# Patient Record
Sex: MALE | Race: WHITE | NOT HISPANIC OR LATINO | URBAN - METROPOLITAN AREA
[De-identification: names, ages, dates, MRNs, and addresses within clinical notes are randomized per-mention and may not be internally consistent; named-entity substitution may affect disease eponyms.]

---

## 2018-12-26 ENCOUNTER — OUTPATIENT (OUTPATIENT)
Dept: OUTPATIENT SERVICES | Facility: HOSPITAL | Age: 53
LOS: 1 days | Discharge: HOME | End: 2018-12-26

## 2018-12-26 VITALS
RESPIRATION RATE: 17 BRPM | OXYGEN SATURATION: 97 % | HEART RATE: 52 BPM | SYSTOLIC BLOOD PRESSURE: 136 MMHG | DIASTOLIC BLOOD PRESSURE: 73 MMHG

## 2018-12-26 VITALS
HEIGHT: 68 IN | SYSTOLIC BLOOD PRESSURE: 171 MMHG | DIASTOLIC BLOOD PRESSURE: 79 MMHG | OXYGEN SATURATION: 97 % | RESPIRATION RATE: 45 BRPM | WEIGHT: 190.04 LBS | TEMPERATURE: 98 F

## 2018-12-26 DIAGNOSIS — Z90.49 ACQUIRED ABSENCE OF OTHER SPECIFIED PARTS OF DIGESTIVE TRACT: Chronic | ICD-10-CM

## 2018-12-26 RX ORDER — SODIUM CHLORIDE 9 MG/ML
1000 INJECTION, SOLUTION INTRAVENOUS
Qty: 0 | Refills: 0 | Status: DISCONTINUED | OUTPATIENT
Start: 2018-12-26 | End: 2019-01-10

## 2018-12-26 RX ORDER — OXYCODONE AND ACETAMINOPHEN 5; 325 MG/1; MG/1
1 TABLET ORAL ONCE
Qty: 0 | Refills: 0 | Status: DISCONTINUED | OUTPATIENT
Start: 2018-12-26 | End: 2018-12-26

## 2018-12-26 RX ORDER — HYDROMORPHONE HYDROCHLORIDE 2 MG/ML
0.5 INJECTION INTRAMUSCULAR; INTRAVENOUS; SUBCUTANEOUS
Qty: 0 | Refills: 0 | Status: DISCONTINUED | OUTPATIENT
Start: 2018-12-26 | End: 2018-12-26

## 2018-12-26 RX ORDER — ONDANSETRON 8 MG/1
4 TABLET, FILM COATED ORAL ONCE
Qty: 0 | Refills: 0 | Status: DISCONTINUED | OUTPATIENT
Start: 2018-12-26 | End: 2019-01-10

## 2018-12-26 RX ADMIN — SODIUM CHLORIDE 100 MILLILITER(S): 9 INJECTION, SOLUTION INTRAVENOUS at 09:46

## 2018-12-26 NOTE — CHART NOTE - NSCHARTNOTEFT_GEN_A_CORE
Pt made comfortable on OR table, ASA monitors applied. Versed 2mg  and Fentanyl 50 mcgs given pt noted to have decrease in HR from 70's to high 30's. glyco 0.2 mg given without positive response. Discussed cardiac status with Dr. Islas decision made to cancel case and have pt reschedule after full cardiac workup. 12 Lead EKG to be obtained in PACU and pt to have cardiac evaluation

## 2018-12-26 NOTE — BRIEF OPERATIVE NOTE - PRE-OP DX
Compression of ulnar nerve at multiple levels, left  12/26/2018    Active  Alxe Islas  Joint synovitis  12/26/2018    Active  Alex Islas  Other primary arthrosis of first carpometacarpal joint  12/26/2018    Active  Alex Islas  Subluxation of thumb  12/26/2018    Active  Alex Islas  Synovitis and tenosynovitis of hand  12/26/2018    Active  Alex Islas  Tendon adhesions  12/26/2018    Active  Alex Islas

## 2018-12-26 NOTE — PRE-ANESTHESIA EVALUATION ADULT - NSPROPOSEDPROCEDFT_GEN_ALL_CORE
Left thumb metacarpal arthrodesis, neuroplasty ulnar nerve at left elbow, tenolysis left middle finger

## 2018-12-26 NOTE — BRIEF OPERATIVE NOTE - PROCEDURE
<<-----Click on this checkbox to enter Procedure Neurolysis of ulnar nerve  12/26/2018    Active  TWOLOSMUMTAZ  Neuroplasty of left ulnar nerve  12/26/2018    Active  JANNETTE  Synovectomy of finger  12/26/2018    Active  JANNETTE  Tenolysis of flexor tendon of finger  12/26/2018    Active  TWOLOSMUMTAZ  Extensor tendon repair of finger of left hand  12/26/2018    Active  JANNETTE  Synovectomy of metacarpophalangeal (MCP) joint  12/26/2018    Active  JANNETTE  Arthrodesis  12/26/2018    Active  JANNETTE

## 2018-12-26 NOTE — CONSULT NOTE ADULT - SUBJECTIVE AND OBJECTIVE BOX
Patient is a 77y old  Male who presents with a chief complaint of bradycardia    HPI:    77 year old man with PMHx DLD, HTN presenting to ambulatory surgery for nerve repair of trigger finger. During induction with 2mg versed and 50mcg fentanyl the patient's HR dropped from 70s to 30s and bp 146/84. The patient was given glycopyrrolate and the surgery was cancelled until further cardiac workup. 12 lead EKG done after the patient was brought to [facility] showed sinus arrhythmia with HR 66 bpm. As per the patient this happened about 5 years ago and he was told by his PMD that because he is very active(plays volleyball) that his HR will be low. Additionally, he says when he takes his blood pressure readings that his HR never goes above 50. He denied any SOB, dizziness, HA, cp, fatigue, LE swelling.      PAST MEDICAL & SURGICAL HISTORY:  High cholesterol  HTN (hypertension)  S/P cholecystectomy: spine surgery, left carpal tunnel       ECHO: N/A      MEDICATIONS  (STANDING):  lactated ringers. 1000 milliLiter(s) (100 mL/Hr) IV Continuous <Continuous>    MEDICATIONS  (PRN):  HYDROmorphone  Injectable 0.5 milliGRAM(s) IV Push every 10 minutes PRN Severe Pain (7 - 10)  ondansetron Injectable 4 milliGRAM(s) IV Push once PRN Nausea and/or Vomiting  oxyCODONE    5 mG/acetaminophen 325 mG 1 Tablet(s) Oral once PRN Moderate Pain (4 - 6)      FAMILY HISTORY:  Non-contributory    REVIEW OF SYSTEMS:  CONSTITUTIONAL: No fever, weight loss, or fatigue  EYES: No eye pain, visual disturbances, or discharge  ENMT:  No difficulty hearing, tinnitus, vertigo; No sinus or throat pain  NECK: No pain or stiffness  RESPIRATORY: No cough, wheezing, chills or hemoptysis; No shortness of breath  CARDIOVASCULAR: No chest pain, palpitations, dizziness, or leg swelling  GASTROINTESTINAL: No abdominal or epigastric pain. No nausea, vomiting, or hematemesis; No diarrhea or constipation. No melena or hematochezia.  GENITOURINARY: No dysuria, frequency, hematuria, or incontinence  NEUROLOGICAL: No headaches, memory loss, loss of strength, numbness, or tremors  SKIN: No itching, burning, rashes, or lesions   LYMPH NODES: No enlarged glands  ENDOCRINE: No heat or cold intolerance; No hair loss  MUSCULOSKELETAL: No joint pain or swelling; No muscle, back, or extremity pain  PSYCHIATRIC: No depression, anxiety, mood swings, or difficulty sleeping  HEME/LYMPH: No easy bruising, or bleeding gums  ALLERY AND IMMUNOLOGIC: No hives or eczema    	  SOCIAL HISTORY:    Denied smoking  Denied alcohol use  Denied illicit drug use    Vital Signs Last 24 Hrs  T(C): 36.4 (26 Dec 2018 09:30), Max: 36.6 (26 Dec 2018 07:58)  T(F): 97.5 (26 Dec 2018 09:30), Max: 97.8 (26 Dec 2018 07:58)  HR: 44 (26 Dec 2018 10:00) (44 - 65)  BP: 117/70 (26 Dec 2018 10:00) (109/72 - 171/79)  BP(mean): --  RR: 16 (26 Dec 2018 10:00) (16 - 45)  SpO2: 98% (26 Dec 2018 10:00) (95% - 98%)    PHYSICAL EXAM:  GENERAL: NAD, well-groomed, well-developed  HEAD:  NCAT  EYES: EOMI, PERRLA, conjunctiva clear  ENMT: No tonsillar erythema, exudates, or enlargement; Moist mucous membranes, Good dentition, No lesions  NECK: Supple, No JVD, Normal thyroid  NERVOUS SYSTEM: AAOX4, Good concentration; Motor Strength 5/5 B/L upper and lower extremities  CHEST/LUNG: CTA b/l no w/r/r  HEART: +s1s2 bradycardic no m/g/r  ABDOMEN: soft, NT/ND (+) bs, no HSM  EXTREMITIES:  2+ Peripheral Pulses, No c/c/e  LYMPH: No lymphadenopathy noted  SKIN: No rashes or lesions      ECG:   NSR with sinus arrhythmia VR 66 bpm  QTc 454 ms CA 0.16ms QRS 90ms    rhythm strip from case: HR 38bpm    Preop EKG 12/17/2018: NSR HR 75 bpm    LABS:   Na 140   K 4.3  Cl: 97  CO2 24  Ca 9.7  Cr 1.05  GFR 69     TSH 4.15  T3(total) 108  T4 7.3  Free T4 1.28    Hb 15.5  Hct 46.8  plt 215  WBC 9.1    RADIOLOGY & ADDITIONAL STUDIES:    CXR PA and lat(12/17/2018): No acute cardiopulmonary disease Patient is a 77y old  Male who presents with a chief complaint of bradycardia    HPI:    77 year old man with PMHx DLD, HTN presenting to ambulatory surgery for nerve repair of trigger finger. During induction with 2mg versed and 50mcg fentanyl the patient's HR dropped from 70s to 30s and bp 146/84. The patient was given glycopyrrolate and the surgery was cancelled until further cardiac workup. 12 lead EKG done after the patient was brought to [facility] showed sinus arrhythmia with HR 66 bpm. As per the patient this happened about 5 years ago and he was told by his PMD that because he is very active(plays volleyball, raquetball) that his HR will be low. Additionally, he says when he takes his blood pressure readings that his HR never goes above 50. He denied any SOB, dizziness, HA, cp, fatigue, LE swelling. Of note, when asked, he admits to non-restorative sleep, wife corroborates that he does indeed snore and at times gasps for air in the middle of the night.      PAST MEDICAL & SURGICAL HISTORY:  High cholesterol  HTN (hypertension)  S/P cholecystectomy: spine surgery, left carpal tunnel       ECHO: N/A      MEDICATIONS  (STANDING):  lactated ringers. 1000 milliLiter(s) (100 mL/Hr) IV Continuous <Continuous>    MEDICATIONS  (PRN):  HYDROmorphone  Injectable 0.5 milliGRAM(s) IV Push every 10 minutes PRN Severe Pain (7 - 10)  ondansetron Injectable 4 milliGRAM(s) IV Push once PRN Nausea and/or Vomiting  oxyCODONE    5 mG/acetaminophen 325 mG 1 Tablet(s) Oral once PRN Moderate Pain (4 - 6)      FAMILY HISTORY:  Non-contributory    REVIEW OF SYSTEMS:  CONSTITUTIONAL: No fever, weight loss, or fatigue  EYES: No eye pain, visual disturbances, or discharge  ENMT:  No difficulty hearing, tinnitus, vertigo; No sinus or throat pain  NECK: No pain or stiffness  RESPIRATORY: No cough, wheezing, chills or hemoptysis; No shortness of breath  CARDIOVASCULAR: No chest pain, palpitations, dizziness, or leg swelling  GASTROINTESTINAL: No abdominal or epigastric pain. No nausea, vomiting, or hematemesis; No diarrhea or constipation. No melena or hematochezia.  GENITOURINARY: No dysuria, frequency, hematuria, or incontinence  NEUROLOGICAL: No headaches, memory loss, loss of strength, numbness, or tremors  SKIN: No itching, burning, rashes, or lesions   LYMPH NODES: No enlarged glands  ENDOCRINE: No heat or cold intolerance; No hair loss  MUSCULOSKELETAL: No joint pain or swelling; No muscle, back, or extremity pain  PSYCHIATRIC: No depression, anxiety, mood swings, or difficulty sleeping  HEME/LYMPH: No easy bruising, or bleeding gums  ALLERY AND IMMUNOLOGIC: No hives or eczema    	  SOCIAL HISTORY:    Denied smoking  Denied alcohol use  Denied illicit drug use    Vital Signs Last 24 Hrs  T(C): 36.4 (26 Dec 2018 09:30), Max: 36.6 (26 Dec 2018 07:58)  T(F): 97.5 (26 Dec 2018 09:30), Max: 97.8 (26 Dec 2018 07:58)  HR: 44 (26 Dec 2018 10:00) (44 - 65)  BP: 117/70 (26 Dec 2018 10:00) (109/72 - 171/79)  BP(mean): --  RR: 16 (26 Dec 2018 10:00) (16 - 45)  SpO2: 98% (26 Dec 2018 10:00) (95% - 98%)    PHYSICAL EXAM:  GENERAL: NAD, well-groomed, well-developed  HEAD:  NCAT  EYES: EOMI, PERRLA, conjunctiva clear  ENMT: No tonsillar erythema, exudates, or enlargement; Moist mucous membranes, Good dentition, No lesions  NECK: Supple, No JVD, Normal thyroid  NERVOUS SYSTEM: AAOX4, Good concentration; Motor Strength 5/5 B/L upper and lower extremities  CHEST/LUNG: CTA b/l no w/r/r  HEART: +s1s2 bradycardic no m/g/r  ABDOMEN: soft, NT/ND (+) bs, no HSM  EXTREMITIES:  2+ Peripheral Pulses, No c/c/e  LYMPH: No lymphadenopathy noted  SKIN: No rashes or lesions      ECG:   NSR with sinus arrhythmia VR 66 bpm  QTc 454 ms MI 0.16ms QRS 90ms    rhythm strip from case: HR 38bpm    Preop EKG 12/17/2018: NSR HR 75 bpm    LABS:   Na 140   K 4.3  Cl: 97  CO2 24  Ca 9.7  Cr 1.05  GFR 69     TSH 4.15  T3(total) 108  T4 7.3  Free T4 1.28    Hb 15.5  Hct 46.8  plt 215  WBC 9.1    RADIOLOGY & ADDITIONAL STUDIES:    CXR PA and lat(12/17/2018): No acute cardiopulmonary disease Patient is a 77y old  Male who presents with a chief complaint of bradycardia    HPI:    77 year old man with PMHx DLD, HTN presenting to ambulatory surgery for nerve repair of trigger finger. During induction with 2mg versed and 50mcg fentanyl the patient's HR dropped from 70s to 30s and bp 146/84. The patient was given glycopyrrolate and the surgery was cancelled until further cardiac workup. 12 lead EKG done after the patient was brought to [facility] showed sinus arrhythmia with HR 66 bpm. As per the patient this happened about 5 years ago and he was told by his PMD that because he is very active(plays volleyball, raquetball) that his HR will be low. Additionally, he says when he takes his blood pressure readings that his HR never goes above 50. He denied any SOB, dizziness, HA, cp, fatigue, LE swelling. Of note, when asked, he admits to non-restorative sleep, wife corroborates that he does indeed snore and at times gasps for air in the middle of the night.      PAST MEDICAL & SURGICAL HISTORY:  High cholesterol  HTN (hypertension)  S/P cholecystectomy: spine surgery, left carpal tunnel       ECHO: N/A      MEDICATIONS  (STANDING):  lactated ringers. 1000 milliLiter(s) (100 mL/Hr) IV Continuous <Continuous>    MEDICATIONS  (PRN):  HYDROmorphone  Injectable 0.5 milliGRAM(s) IV Push every 10 minutes PRN Severe Pain (7 - 10)  ondansetron Injectable 4 milliGRAM(s) IV Push once PRN Nausea and/or Vomiting  oxyCODONE    5 mG/acetaminophen 325 mG 1 Tablet(s) Oral once PRN Moderate Pain (4 - 6)      FAMILY HISTORY:  Non-contributory    REVIEW OF SYSTEMS:  CONSTITUTIONAL: No fever, weight loss, or fatigue  EYES: No eye pain, visual disturbances, or discharge  ENMT:  No difficulty hearing, tinnitus, vertigo; No sinus or throat pain  NECK: No pain or stiffness  RESPIRATORY: No cough, wheezing, chills or hemoptysis; No shortness of breath  CARDIOVASCULAR: No chest pain, palpitations, dizziness, or leg swelling  GASTROINTESTINAL: No abdominal or epigastric pain. No nausea, vomiting, or hematemesis; No diarrhea or constipation. No melena or hematochezia.  GENITOURINARY: No dysuria, frequency, hematuria, or incontinence  NEUROLOGICAL: No headaches, memory loss, loss of strength, numbness, or tremors  SKIN: No itching, burning, rashes, or lesions   LYMPH NODES: No enlarged glands  ENDOCRINE: No heat or cold intolerance; No hair loss  MUSCULOSKELETAL: No joint pain or swelling; No muscle, back, or extremity pain  PSYCHIATRIC: No depression, anxiety, mood swings, or difficulty sleeping  HEME/LYMPH: No easy bruising, or bleeding gums  ALLERY AND IMMUNOLOGIC: No hives or eczema    	  SOCIAL HISTORY:    Denied smoking  Denied alcohol use  Denied illicit drug use    Vital Signs Last 24 Hrs  T(C): 36.4 (26 Dec 2018 09:30), Max: 36.6 (26 Dec 2018 07:58)  T(F): 97.5 (26 Dec 2018 09:30), Max: 97.8 (26 Dec 2018 07:58)  HR: 44 (26 Dec 2018 10:00) (44 - 65)  BP: 117/70 (26 Dec 2018 10:00) (109/72 - 171/79)  BP(mean): --  RR: 16 (26 Dec 2018 10:00) (16 - 45)  SpO2: 98% (26 Dec 2018 10:00) (95% - 98%)    PHYSICAL EXAM:  GENERAL: NAD, well-groomed, well-developed  HEAD:  NCAT  EYES: EOMI, PERRLA, conjunctiva clear  ENMT: No tonsillar erythema, exudates, or enlargement; Moist mucous membranes, Good dentition, No lesions  NECK: Supple, No JVD, Normal thyroid  NERVOUS SYSTEM: AAOX4, Good concentration; Motor Strength 5/5 B/L upper and lower extremities  CHEST/LUNG: CTA b/l no w/r/r  HEART: +s1s2 bradycardic no m/g/r  ABDOMEN: soft, NT/ND (+) bs, no HSM  EXTREMITIES/MS:  2+ Peripheral Pulses, No c/c/e  LYMPH: No lymphadenopathy noted  SKIN: No rashes or lesions      ECG:   NSR with sinus arrhythmia VR 66 bpm  QTc 454 ms FL 0.16ms QRS 90ms    rhythm strip from case: HR 38bpm    Preop EKG 12/17/2018: NSR HR 75 bpm    LABS:   Na 140   K 4.3  Cl: 97  CO2 24  Ca 9.7  Cr 1.05  GFR 69     TSH 4.15  T3(total) 108  T4 7.3  Free T4 1.28    Hb 15.5  Hct 46.8  plt 215  WBC 9.1    RADIOLOGY & ADDITIONAL STUDIES:    CXR PA and lat(12/17/2018): No acute cardiopulmonary disease

## 2018-12-26 NOTE — CONSULT NOTE ADULT - ASSESSMENT
77 year old man with PMHx DLD, HTN presenting to ambulatory surgery for nerve repair of trigger finger and became bradycardic to 30s during induction with 2mg versed and 50mcg fentanyl. Bp 146/84    Impression  Bradycardia 2/2 anesthesia vs athlete's heart vs SSS. r/o chronotropic incompetence  DLD  HTN    Plan  -pt HR increased from 40s to mid 70s upon moving around.  -will need exercise stress test to assess/prove chronotropic comepetence(or incompetence)  -patient currently asymptomatic    ** WILL DISCUSS WITH ATTENDING** 77 year old man with PMHx DLD, HTN presenting to ambulatory surgery for nerve repair of trigger finger and became bradycardic to 30s during induction with 2mg versed and 50mcg fentanyl. Bp 146/84    Impression  Bradycardia 2/2 anesthesia vs athlete's heart vs SSS. r/o chronotropic incompetence  DLD  HTN    Plan  -pt HR increased from 40s to mid 70s upon moving around.  -will need exercise stress test to assess/prove chronotropic competence(or incompetence)  -patient evidently had what sounds to be a nuclear stress test before a back surgery which was normal. Attempt to get reports.  -patient currently asymptomatic  -will likely need sleep study to assess for RICARDO    ** WILL DISCUSS WITH ATTENDING** 77 year old man with PMHx DLD, HTN presenting to ambulatory surgery for nerve repair of trigger finger and became bradycardic to 30s during induction with 2mg versed and 50mcg fentanyl. Bp 146/84    Impression  Bradycardia 2/2 anesthesia vs athlete's heart vs SSS. r/o chronotropic incompetence  DLD  HTN    Plan  -pt HR increased from 40s to mid 70s upon moving around.  -may need exercise stress test to officially assess/prove chronotropic competence(or incompetence), Can be done as outpatient  -patient evidently had what sounds to be a nuclear stress test before a back surgery which was normal. Attempt to get reports.  -patient currently asymptomatic  -will likely need sleep study to assess for RICARDO    ** WILL DISCUSS WITH ATTENDING** 77 year old man with PMHx DLD, HTN presenting to ambulatory surgery for nerve repair of trigger finger and became bradycardic to 30s during induction with 2mg versed and 50mcg fentanyl. Bp 146/84    Impression  Bradycardia 2/2 anesthesia vs athlete's heart vs SSS. r/o chronotropic incompetence  DLD  HTN    Plan  -pt HR increased from 40s to mid 70s upon moving around.  -may need exercise stress test to officially assess/prove chronotropic competence(or incompetence), Can be done as outpatient  -patient evidently had what sounds to be a nuclear stress test before a back surgery which was normal. Attempt to get reports.  -patient currently asymptomatic  -will likely need sleep study to assess for RICARDO  -can discharge from cardiac standpoint  -will need PMD follow up but not necessarily cardiology for workup. 77 year old man with PMHx DLD, HTN presenting to ambulatory surgery for nerve repair of trigger finger and became bradycardic to 30s during induction with 2mg versed and 50mcg fentanyl. Bp 146/84    Impression  Bradycardia 2/2 anesthesia vs athlete's heart vs SSS. r/o chronotropic incompetence  DLD  HTN    Plan  -pt HR increased from 40s to mid 70s upon moving around.  -exercise stress test to officially assess/prove chronotropic competence(or incompetence), as outpatient  -patient currently asymptomatic  -will likely need sleep study to assess for RICARDO  -can discharge home from cardiac standpoint

## 2018-12-26 NOTE — BRIEF OPERATIVE NOTE - POST-OP DX
Arthrosis of hand  12/26/2018    Active  Alex Islas  Compression of ulnar nerve at multiple levels, left  12/26/2018    Active  Alex Islas  Joint synovitis  12/26/2018    Alex Tabares  Subluxation of thumb  12/26/2018    Alex Tabares  Synovitis of hand  12/26/2018    Active  Alex Islas  Tendon adhesions  12/26/2018    Active  Alex Islas

## 2018-12-26 NOTE — CHART NOTE - NSCHARTNOTEFT_GEN_A_CORE
PACU ANESTHESIA ADMISSION NOTE      Procedure: case cancelled   Post op diagnosis:  left arm pain    ____  Intubated  TV:______       Rate: ______      FiO2: ______    _x___  Patent Airway    _x___  Full return of protective reflexes    __  Full recovery from anesthesia / back to baseline status    Vitals:  T97.5  HR: 62  BP:135/72  RR:16  SpO2:97     Mental Status:  _x___ Awake   _____ Alert   _____ Drowsy   _____ Sedated    Nausea/Vomiting:  _x___  NO       ______Yes,   See Post - Op Orders         Pain Scale (0-10):  __0___    Treatment: _x___ None    ____ See Post - Op/PCA Orders    Post - Operative Fluids:   __x__ Oral   ____ See Post - Op Orders    Plan: Discharge:   _x___Home       _____Floor     _____Critical Care    _____  Other:_________________    Comments: Spontaneously breathing VS stable   No anesthesia issues or complications noted.  Discharge when criteria met.

## 2019-01-03 DIAGNOSIS — Z02.9 ENCOUNTER FOR ADMINISTRATIVE EXAMINATIONS, UNSPECIFIED: ICD-10-CM

## 2019-01-23 PROBLEM — I10 ESSENTIAL (PRIMARY) HYPERTENSION: Chronic | Status: ACTIVE | Noted: 2018-12-26

## 2019-01-23 PROBLEM — E78.00 PURE HYPERCHOLESTEROLEMIA, UNSPECIFIED: Chronic | Status: ACTIVE | Noted: 2018-12-26

## 2019-02-13 ENCOUNTER — OUTPATIENT (OUTPATIENT)
Dept: OUTPATIENT SERVICES | Facility: HOSPITAL | Age: 54
LOS: 1 days | Discharge: HOME | End: 2019-02-13

## 2019-02-13 VITALS
HEART RATE: 58 BPM | RESPIRATION RATE: 18 BRPM | OXYGEN SATURATION: 97 % | TEMPERATURE: 98 F | SYSTOLIC BLOOD PRESSURE: 158 MMHG | DIASTOLIC BLOOD PRESSURE: 75 MMHG | HEIGHT: 68 IN | WEIGHT: 190.04 LBS

## 2019-02-13 VITALS
DIASTOLIC BLOOD PRESSURE: 60 MMHG | RESPIRATION RATE: 19 BRPM | OXYGEN SATURATION: 97 % | HEART RATE: 90 BPM | SYSTOLIC BLOOD PRESSURE: 124 MMHG

## 2019-02-13 DIAGNOSIS — Z90.49 ACQUIRED ABSENCE OF OTHER SPECIFIED PARTS OF DIGESTIVE TRACT: Chronic | ICD-10-CM

## 2019-02-13 RX ORDER — HYDROCHLOROTHIAZIDE 25 MG
0 TABLET ORAL
Qty: 0 | Refills: 0 | COMMUNITY

## 2019-02-13 RX ORDER — OXYCODONE AND ACETAMINOPHEN 5; 325 MG/1; MG/1
1 TABLET ORAL ONCE
Qty: 0 | Refills: 0 | Status: DISCONTINUED | OUTPATIENT
Start: 2019-02-13 | End: 2019-02-13

## 2019-02-13 RX ORDER — OMEGA-3 ACID ETHYL ESTERS 1 G
0 CAPSULE ORAL
Qty: 0 | Refills: 0 | COMMUNITY

## 2019-02-13 RX ORDER — SODIUM CHLORIDE 9 MG/ML
1000 INJECTION, SOLUTION INTRAVENOUS
Qty: 0 | Refills: 0 | Status: DISCONTINUED | OUTPATIENT
Start: 2019-02-13 | End: 2019-02-28

## 2019-02-13 RX ORDER — MORPHINE SULFATE 50 MG/1
2 CAPSULE, EXTENDED RELEASE ORAL
Qty: 0 | Refills: 0 | Status: DISCONTINUED | OUTPATIENT
Start: 2019-02-13 | End: 2019-02-13

## 2019-02-13 RX ORDER — ASPIRIN/CALCIUM CARB/MAGNESIUM 324 MG
1 TABLET ORAL
Qty: 0 | Refills: 0 | COMMUNITY

## 2019-02-13 RX ORDER — ONDANSETRON 8 MG/1
4 TABLET, FILM COATED ORAL ONCE
Qty: 0 | Refills: 0 | Status: DISCONTINUED | OUTPATIENT
Start: 2019-02-13 | End: 2019-02-28

## 2019-02-13 RX ORDER — HYDROMORPHONE HYDROCHLORIDE 2 MG/ML
0.5 INJECTION INTRAMUSCULAR; INTRAVENOUS; SUBCUTANEOUS
Qty: 0 | Refills: 0 | Status: DISCONTINUED | OUTPATIENT
Start: 2019-02-13 | End: 2019-02-13

## 2019-02-13 RX ADMIN — SODIUM CHLORIDE 100 MILLILITER(S): 9 INJECTION, SOLUTION INTRAVENOUS at 11:13

## 2019-02-13 NOTE — BRIEF OPERATIVE NOTE - OPERATION/FINDINGS
tendon adhesions, synovitis finger, compression apment of ulnar nerve at elbow, arthrosis synovitis MPJ thumb

## 2019-02-13 NOTE — BRIEF OPERATIVE NOTE - PRE-OP DX
Arthrosis of hand  02/13/2019    Active  Alex Islas  Compression of ulnar nerve at multiple levels, left  02/13/2019    Active  Alex Islas  Synovitis of finger  02/13/2019    Alex Tabares  Synovitis of hand  02/13/2019    Active  Alex Islas  Tendon adhesions  02/13/2019    Active  Alex Islas  Ulnar neuropathy at elbow  02/13/2019    Active  Alex Islas

## 2019-02-13 NOTE — BRIEF OPERATIVE NOTE - POST-OP DX
Arthrosis of hand  02/13/2019    Active  Alex Islas  Compression of ulnar nerve at multiple levels  02/13/2019    Active  Alex Islas  Joint synovitis  02/13/2019    Alex Tabares  Synovitis of finger  02/13/2019    Alex Tabares  Tendon adhesions  02/13/2019    Active  Alex Islas  Ulnar neuropathy at elbow  02/13/2019    Active  Alex Islas

## 2019-02-13 NOTE — CHART NOTE - NSCHARTNOTEFT_GEN_A_CORE
PACU ANESTHESIA ADMISSION NOTE      Procedure: Flexor tendon tenolysis of palm and finger  Synovectomy of finger  Neuroplasty and/or transposition; ulnar nerve at elbow  Decompression or neurolysis of ulnar or median nerve  Synovectomy of hand  Arthrodesis, MCP joint, thumb    Post op diagnosis:  Joint synovitis  Arthrosis of hand  Ulnar neuropathy at elbow  Compression of ulnar nerve at multiple levels  Synovitis of finger  Tendon adhesions      ____  Intubated  TV:______       Rate: ______      FiO2: ______    _x___  Patent Airway    __x__  Full return of protective reflexes    _x___  Full recovery from anesthesia / back to baseline     Vitals:   T:   97.5        R:  21                BP:    110/56              Sat:  96                 P: 100      Mental Status:  ____ Awake   _____ Alert   ___x__ Drowsy   _____ Sedated    Nausea/Vomiting:  ___x_ NO  ______Yes,   See Post - Op Orders          Pain Scale (0-10):  __0___    Treatment: ____ None    ____ See Post - Op/PCA Orders    Post - Operative Fluids:   ____ Oral   __x__ See Post - Op Orders    Plan: Discharge:   _x___Home       _____Floor     _____Critical Care    _____  Other:_________________    Comments: No anesthesia complications noted.

## 2019-02-13 NOTE — ASU DISCHARGE PLAN (ADULT/PEDIATRIC). - SPECIAL INSTRUCTIONS
DIET:    Resume normal diet  No alcoholic  beverages for 24 hours or if on prescribed narcotic pain medications.    MEDICATION:    Resume your preoperative oral medications.  Check with your physician before starting aspirin, Coumadin, or other blood thinners.  Prescriptions will be electronically prescribed from my office -  take as directed.      ACTIVITY:    Rest today and limit your activities for 24 hours.  Do not drive or operate machinery for 24 hours - if you received anesthesia.  When taking pain medication, be careful as you walk or climb stairs.  It is not advisable to drive while taking prescribed pain medication.    SPECIAL INSTRUCTIONS:    __x___ Elevate operative site above heart level or as directed.  _____ Apply ice to operative site as directed.  _____ Use  sling as directed.  __x___ Exercise fingers.    DRESSING CARE:    _____ You may change the dressing 4 days. Keep wound covered with band-aids.  __x___ Do not change the dressing until your doctor see you.  _____ You can loosen or rewrap the dressing.  _____  Keep dressing clean and dry.  _____ You may shower in _____ day(s) with the extremity covered by a plastic bag.  _____ OK to wash hand , including showers, in _____ day(s).    ADDITIONAL  INFORMATION:    Post operative visit should be scheduled for next week.  If you are not aware of visit please contact office.  If you have any questions or concerns call office at  [phone]    Notify your doctor if you develop   Fever  Excessive Swelling  Chills   Drainage   Pain not controlled by medication  Persistent numbness in hand or fingers    If an Emergency arises call 911 and/or go to the Emergency Room

## 2019-02-13 NOTE — BRIEF OPERATIVE NOTE - PROCEDURE
<<-----Click on this checkbox to enter Procedure Arthrodesis, MCP joint, thumb  02/13/2019    Active  TWOLOSZYN  Synovectomy of hand  02/13/2019    Active  TWOLOSZYN  Decompression or neurolysis of ulnar or median nerve  02/13/2019    Active  TWOLOSZYN  Neuroplasty and/or transposition; ulnar nerve at elbow  02/13/2019    Active  TWOLOSZYN  Synovectomy of finger  02/13/2019    Active  TWOLOSZYN  Flexor tendon tenolysis of palm and finger  02/13/2019    Active  TWOLOSZYN

## 2019-02-15 LAB — SURGICAL PATHOLOGY STUDY: SIGNIFICANT CHANGE UP

## 2019-02-17 DIAGNOSIS — M48.00 SPINAL STENOSIS, SITE UNSPECIFIED: ICD-10-CM

## 2019-02-17 DIAGNOSIS — Z87.442 PERSONAL HISTORY OF URINARY CALCULI: ICD-10-CM

## 2019-02-17 DIAGNOSIS — Z79.82 LONG TERM (CURRENT) USE OF ASPIRIN: ICD-10-CM

## 2019-02-17 DIAGNOSIS — M24.842: ICD-10-CM

## 2019-02-17 DIAGNOSIS — K57.30 DIVERTICULOSIS OF LARGE INTESTINE WITHOUT PERFORATION OR ABSCESS WITHOUT BLEEDING: ICD-10-CM

## 2019-02-17 DIAGNOSIS — M19.042 PRIMARY OSTEOARTHRITIS, LEFT HAND: ICD-10-CM

## 2019-02-17 DIAGNOSIS — I10 ESSENTIAL (PRIMARY) HYPERTENSION: ICD-10-CM

## 2019-02-17 DIAGNOSIS — M72.0 PALMAR FASCIAL FIBROMATOSIS [DUPUYTREN]: ICD-10-CM

## 2019-02-17 DIAGNOSIS — I34.0 NONRHEUMATIC MITRAL (VALVE) INSUFFICIENCY: ICD-10-CM

## 2019-02-17 DIAGNOSIS — G56.22 LESION OF ULNAR NERVE, LEFT UPPER LIMB: ICD-10-CM

## 2019-02-17 DIAGNOSIS — M65.842 OTHER SYNOVITIS AND TENOSYNOVITIS, LEFT HAND: ICD-10-CM

## 2019-02-17 DIAGNOSIS — E78.5 HYPERLIPIDEMIA, UNSPECIFIED: ICD-10-CM

## 2020-01-09 NOTE — BRIEF OPERATIVE NOTE - COMMENTS
----- Message from Luis Fernando Frank MD sent at 1/8/2020 10:08 PM CST -----  Please let patient know that the MRI confirms a subcutaneous lipoma. I recommend that we take it out as a day surgery procedure as it is still rather deep even though it is not within the muscle. If he agrees please place orders found in telephone encounter dated 8 December 2020.   Thanks,  ES Case was cancelled secondary to bradycadric episodes which may represent sick sinus syndrome, has had a history of this in the past but no treatment rendered will refer back to PMD for cariologic evaluation/

## 2023-01-12 PROBLEM — Z00.00 ENCOUNTER FOR PREVENTIVE HEALTH EXAMINATION: Status: ACTIVE | Noted: 2023-01-12

## 2023-01-13 ENCOUNTER — APPOINTMENT (OUTPATIENT)
Dept: ORTHOPEDIC SURGERY | Facility: CLINIC | Age: 58
End: 2023-01-13
Payer: COMMERCIAL

## 2023-01-13 DIAGNOSIS — Z78.9 OTHER SPECIFIED HEALTH STATUS: ICD-10-CM

## 2023-01-13 DIAGNOSIS — M75.82 OTHER SHOULDER LESIONS, LEFT SHOULDER: ICD-10-CM

## 2023-01-13 DIAGNOSIS — S46.212A STRAIN OF MUSCLE, FASCIA AND TENDON OF OTHER PARTS OF BICEPS, LEFT ARM, INITIAL ENCOUNTER: ICD-10-CM

## 2023-01-13 DIAGNOSIS — M77.12 LATERAL EPICONDYLITIS, LEFT ELBOW: ICD-10-CM

## 2023-01-13 PROCEDURE — 73030 X-RAY EXAM OF SHOULDER: CPT | Mod: LT

## 2023-01-13 PROCEDURE — 99214 OFFICE O/P EST MOD 30 MIN: CPT | Mod: 25

## 2023-01-13 PROCEDURE — 73070 X-RAY EXAM OF ELBOW: CPT | Mod: LT

## 2023-01-13 PROCEDURE — 99204 OFFICE O/P NEW MOD 45 MIN: CPT | Mod: 25

## 2023-01-13 PROCEDURE — 20610 DRAIN/INJ JOINT/BURSA W/O US: CPT | Mod: LT

## 2023-01-13 NOTE — IMAGING
[de-identified] : (Shoulder Examination)\par \par Laterality\par -Left\par \par General\par -In no acute distress: yes\par -Alert and oriented to person, place and time: yes\par -Lymphadenopathy: no\par -Peripheral edema: no\par -Sensation grossly intact to light touch: yes\par -Capillary refill less than 2 seconds: yes \par \par Inspection\par -Skin intact: yes\par -Swelling: no\par -Atrophy: no\par -Scapular winging: no\par -AC deformity: no\par -Biceps deformity: yes\par \par Tenderness to Palpation\par -Bicipital groove: yes\par -AC joint: no\par -Scapulothoracic: no\par -Cervical paraspinal: no\par \par Range of Motion\par -Active forward elevation: 165\par -Passive forward elevation: 175\par -External rotation at the side: 60\par -Internal rotation behind the back: T7 \par -Cervical spine: normal\par \par Strength \par -Forward elevation: 5-\par -External rotation at the side: 5\par -Internal rotation at the side: 5\par -Deltoid: 5\par \par Special Tests\par -Cortez: positive \par -O’Lobo’s: positive \par -Speed’s: positive \par -Cross body adduction: negative\par -Apprehension and relocation: negative\par -Sulcus sign: negative\par -Belly press: negative\par -Spurling’s: negative\par \par \par (Elbow Examination)\par \par Laterality\par -Left\par \par General\par -In no acute distress: yes\par -Alert and oriented to person, place and time: yes\par -Lymphadenopathy: no\par -Peripheral edema: no\par -Sensation grossly intact to light touch: yes\par -Capillary refill less than 2 seconds: yes \par \par Inspection\par -Skin intact: yes\par -Olecranon bursa swelling: no\par -Biceps deformity: no\par -Intrinsic atrophy: no\par \par Tenderness\par -Lateral epicondyle: yes\par -Medial epicondyle: no\par -Radial head: no\par -Olecranon tip: no\par -Antecubital fossa: no\par \par Range of Motion\par -Extension: 0\par -Flexion: 140\par -Pronation: 80\par -Supination: 80\par \par Strength \par -Flexion: 5\par -Extension: 5\par -Pronation: 5\par -Supination: 5\par -Index finger abduction: 5\par 	\par Special Tests\par -Cozen’s: positive\par -[place] biceps hook: negative \par -Tinel’s at elbow: negative \par -Moving valgus stress: negative\par \par  [Type 3 acromion] : Type 3 acromion [Left] : left elbow [There are no fractures, subluxations or dislocations. No significant abnormalities are seen] : There are no fractures, subluxations or dislocations. No significant abnormalities are seen [Degenerative change] : Degenerative change

## 2023-01-13 NOTE — HISTORY OF PRESENT ILLNESS
[de-identified] : (New Visit)\par \par Patient Details\par -Age: 57\par -Occupation: retired\par -Worker’s compensation claim: no\par -No-fault claim: no\par -School injury claim:no \par \par \par Symptom Details \par -Body part: LT shoulder\par -Duration of symptoms: 1 month\par -How symptoms began: reports onset 5 year ago, when he felt a pop in his anterior shoulder while lifting a TV. he reports that symptoms improved before recurring recently without trauma. he notes chronic deformity of his biceps\par -Location of pain: anterolateral\par -Quality of pain: dull\par -Pain score at rest: 2\par -Pain score with activity: 6\par -Swelling: no\par -Stiffness: yes\par -Radicular symptoms (numbness or radiating pain down extremity): no\par \par Symptom Details \par -Body part: LT elbow\par -Duration of symptoms: 3 months\par -How symptoms began: reports old UCL injury, which resolved, but recently had recurrence of lateral elbow pain with activity and sleeping\par -Location of pain: lateral\par -Quality of pain: dull\par -Pain score at rest: 1\par -Pain score with activity: 4\par -Swelling: no\par -Stiffness: yes\par -Radicular symptoms (numbness or radiating pain down extremity): to the wrist but not the fingers\par \par Treatment Details\par -Activity modification: yes has not been throwing\par -Medication: ibuprofen PRN\par -Physical therapy: no\par -Home exercise program: no\par -Injection: no\par -MRI: no\par \par

## 2023-01-13 NOTE — DISCUSSION/SUMMARY
[de-identified] : (Assessment)\par \par History, clinical examination and imaging were most consistent with:\par -left rotator cuff tendonitis versus tear\par -left proximal biceps rupture, chronic\par -left lateral epicondylitis\par \par The diagnosis was explained in detail. The potential non-surgical and surgical treatments were reviewed. The relative risks and benefits of each option were considered relative to the patient’s age, activity level, medical history, symptom severity and previously attempted treatments. \par \par -Non-surgical treatment was selected. The added clinical utility of an MRI was discussed. The patient deferred further diagnostic testing at this time to pursue non-surgical treatment.\par -Patient will proceed with injection and PT for the left shoulder.\par -He will begin bracing and eccentric exercises for the left elbow.\par -Should symptoms fail to improve we would consider MRI of the left shoulder.\par -We discussed that due to the chronic nature of his left proximal biceps rupture we would plan to manage this problem non-surgically. \par \par (Plan)\par \par -Activity modification\par -Physical therapy: script provided\par -Injection: performed in the office today, details in procedure note. \par -Bracing: patient to obtain over the counter counterforce strap\par -Follow up: 6 weeks if failing to improve\par \par \par (MDM) \par \par Problem Complexity\par -Moderate: chronic illness with exacerbation\par \par Risk\par -Moderate: injection\par \par Patient has not been seen by another provider in my practice within the past 2 year who specializes in orthopedic sports medicine, shoulder or elbow surgery. \par \par (Injection Procedure Note)\par \par Laterality \par -Left\par \par Location\par -Subacromial bursa\par \par Contents\par -40 mg kenalog \par -5 mL of 1% lidocaine\par \par Narrative\par -Discussed possible risks of corticosteroid injection including hyperglycemia, infection and skin discoloration. \par -Discussed that due to infection risk, an interval between injection and any future surgery is 6 weeks for arthroscopy and 3 months for arthroplasty.\par -Informed consent was obtained.\par -Injection performed using aseptic technique. Tolerated well with no complications. \par \par

## 2023-02-08 ENCOUNTER — NON-APPOINTMENT (OUTPATIENT)
Age: 58
End: 2023-02-08

## 2023-02-09 RX ORDER — NAPROXEN 500 MG/1
500 TABLET ORAL
Qty: 60 | Refills: 0 | Status: ACTIVE | COMMUNITY
Start: 2023-02-09 | End: 1900-01-01

## 2023-03-23 ENCOUNTER — APPOINTMENT (OUTPATIENT)
Dept: ORTHOPEDIC SURGERY | Facility: CLINIC | Age: 58
End: 2023-03-23

## 2023-05-15 ENCOUNTER — NON-APPOINTMENT (OUTPATIENT)
Age: 58
End: 2023-05-15